# Patient Record
Sex: FEMALE | Race: WHITE | Employment: FULL TIME | URBAN - METROPOLITAN AREA
[De-identification: names, ages, dates, MRNs, and addresses within clinical notes are randomized per-mention and may not be internally consistent; named-entity substitution may affect disease eponyms.]

---

## 2019-03-15 LAB — TSH SERPL-ACNC: <0 UIU/ML (ref 0.3–5)

## 2019-05-10 ENCOUNTER — ANCILLARY PROCEDURE (OUTPATIENT)
Dept: GENERAL RADIOLOGY | Facility: CLINIC | Age: 39
End: 2019-05-10
Attending: PHYSICIAN ASSISTANT
Payer: COMMERCIAL

## 2019-05-10 ENCOUNTER — OFFICE VISIT (OUTPATIENT)
Dept: URGENT CARE | Facility: URGENT CARE | Age: 39
End: 2019-05-10
Payer: COMMERCIAL

## 2019-05-10 VITALS
WEIGHT: 150 LBS | BODY MASS INDEX: 24.11 KG/M2 | HEIGHT: 66 IN | DIASTOLIC BLOOD PRESSURE: 68 MMHG | HEART RATE: 80 BPM | TEMPERATURE: 98.2 F | RESPIRATION RATE: 12 BRPM | SYSTOLIC BLOOD PRESSURE: 110 MMHG

## 2019-05-10 DIAGNOSIS — M25.572 ACUTE LEFT ANKLE PAIN: ICD-10-CM

## 2019-05-10 DIAGNOSIS — S93.402A SPRAIN OF LEFT ANKLE, UNSPECIFIED LIGAMENT, INITIAL ENCOUNTER: Primary | ICD-10-CM

## 2019-05-10 PROCEDURE — 73630 X-RAY EXAM OF FOOT: CPT | Mod: LT | Performed by: FAMILY MEDICINE

## 2019-05-10 PROCEDURE — 73610 X-RAY EXAM OF ANKLE: CPT | Mod: LT | Performed by: FAMILY MEDICINE

## 2019-05-10 PROCEDURE — 99203 OFFICE O/P NEW LOW 30 MIN: CPT | Performed by: PHYSICIAN ASSISTANT

## 2019-05-10 RX ORDER — METHIMAZOLE 10 MG/1
15 TABLET ORAL DAILY
COMMUNITY

## 2019-05-10 ASSESSMENT — MIFFLIN-ST. JEOR: SCORE: 1382.15

## 2019-05-11 NOTE — PROGRESS NOTES
"X-SUBJECTIVE:   Denise Rosado is a 38 year old female presenting with a chief complaint of   Chief Complaint   Patient presents with     Urgent Care     Musculoskeletal Problem     rolled left ankle this afternoon on sidewalk.        She is a new patient of [facility].    MS Injury/Pain    Onset of symptoms was 7 hour(s) ago.  Location: left ankle  Context:       The injury happened while while walking      Mechanism: tripped over sidewalk, inversion injury and fall      Patient experienced immediate pain, delayed pain, was able to bear weight directly after injury but cannot any longer, no deformity was noted by the patient  Course of symptoms is worsening.    Severity moderate  Current and Associated symptoms: Pain, Swelling, Tenderness and Decreased range of motion  Denies  Bruising, Warmth and Redness  Aggravating Factors: walking, weight-bearing and movement  Therapies to improve symptoms include: ice and elevation  This is the first time this type of problem has occurred for this patient.     Review of Systems   ROS: 10 point ROS neg other than the symptoms noted above in the HPI.    No past medical history on file.  No family history on file.  Current Outpatient Medications   Medication Sig Dispense Refill     methIMAzole (TAPAZOLE PO)        order for DME Equipment being ordered: ankle boot, high  Crutches 1 Device 0     PROPRANOLOL HCL PO        sertraline (ZOLOFT) 50 MG tablet Take 50 mg by mouth daily.       Social History     Tobacco Use     Smoking status: Never Smoker     Smokeless tobacco: Never Used   Substance Use Topics     Alcohol use: Not on file       OBJECTIVE  /68   Pulse 80   Ht 1.676 m (5' 6\")   Wt 68 kg (150 lb)   LMP 05/10/2019   Breastfeeding? No   BMI 24.21 kg/m      Physical Exam   Constitutional: She is oriented to person, place, and time. She appears well-developed and well-nourished.   HENT:   Head: Normocephalic and atraumatic.   Nose: Nose normal.   Eyes: " Conjunctivae and EOM are normal.   Neck: Normal range of motion.   Pulmonary/Chest: Effort normal.   Musculoskeletal: She exhibits edema and tenderness. She exhibits no deformity.        Left foot: There is decreased range of motion, tenderness, bony tenderness and swelling. There is normal capillary refill and no deformity.   There is bony tenderness over the fifth metatarsal proximally and lateral ankle mortise along the ATFL. There is significant swelling. Sensation intact. Distal pulses intact.    Neurological: She is alert and oriented to person, place, and time. No sensory deficit.   Skin: Skin is warm and dry.   Psychiatric: She has a normal mood and affect. Her behavior is normal.   Nursing note and vitals reviewed.      Labs:  No results found for this or any previous visit (from the past 24 hour(s)).    X-Ray was done, my findings are: Negative for fracture or disruption of ankle mortise. No fracture of the fifth metatarsal.     ASSESSMENT:      ICD-10-CM    1. Sprain of left ankle, unspecified ligament, initial encounter S93.402A order for DME   2. Acute left ankle pain M25.572 XR Ankle Left G/E 3 Views     XR Foot Left G/E 3 Views        Medical Decision Making:    Differential Diagnosis:  MS Injury Pain: sprain, fracture, muscle strain, contusion and dislocation. Imaging negative for fracture. Patient most likely with sprain of ATFL of left ankle.     Serious Comorbid Conditions:  Adult:  None    PLAN:    MS Injury/Pain  ice, elevate, rest, high ankle boot with crutches, Tylenol and Ibuprofen. Referral to orthopedic  for recheck next week.     Followup:    Follow up with the orthopedic  for recheck of ankle next week. If worsening in the interim, return to clinic or present in the ED.     Geoffrey Rueda PA-C

## 2019-06-21 LAB — TSH SERPL-ACNC: <0 UIU/ML (ref 0.3–5)

## 2019-08-09 LAB — TSH SERPL-ACNC: <0 UIU/ML (ref 0.3–5)

## 2020-01-24 LAB — TSH SERPL-ACNC: <0 UIU/ML (ref 0.3–5)

## 2020-03-20 LAB — TSH SERPL-ACNC: 0.01 UIU/ML (ref 0.3–5)

## 2020-04-22 LAB — TSH SERPL-ACNC: 3.04 UIU/ML (ref 0.3–5)

## 2020-07-01 LAB — TSH SERPL-ACNC: 1.84 UIU/ML (ref 0.3–5)

## 2020-09-04 ENCOUNTER — TRANSFERRED RECORDS (OUTPATIENT)
Dept: HEALTH INFORMATION MANAGEMENT | Facility: CLINIC | Age: 40
End: 2020-09-04

## 2020-09-04 LAB — TSH SERPL-ACNC: <0 UIU/ML (ref 0.3–5)

## 2020-09-10 ENCOUNTER — TRANSFERRED RECORDS (OUTPATIENT)
Dept: HEALTH INFORMATION MANAGEMENT | Facility: CLINIC | Age: 40
End: 2020-09-10

## 2020-09-16 ENCOUNTER — TELEPHONE (OUTPATIENT)
Dept: SURGERY | Facility: CLINIC | Age: 40
End: 2020-09-16

## 2020-09-16 NOTE — TELEPHONE ENCOUNTER
Referral from [facility] for thyroid/[facility] for consult w/[facility]  Left Message for call back

## 2020-09-24 ENCOUNTER — OFFICE VISIT (OUTPATIENT)
Dept: SURGERY | Facility: CLINIC | Age: 40
End: 2020-09-24
Payer: COMMERCIAL

## 2020-09-24 VITALS
DIASTOLIC BLOOD PRESSURE: 72 MMHG | BODY MASS INDEX: 24.91 KG/M2 | HEIGHT: 66 IN | WEIGHT: 155 LBS | SYSTOLIC BLOOD PRESSURE: 126 MMHG | HEART RATE: 103 BPM

## 2020-09-24 DIAGNOSIS — E04.9 THYROID GOITER: ICD-10-CM

## 2020-09-24 DIAGNOSIS — E05.90 HYPERTHYROIDISM: ICD-10-CM

## 2020-09-24 PROCEDURE — 99205 OFFICE O/P NEW HI 60 MIN: CPT | Performed by: SURGERY

## 2020-09-24 RX ORDER — LORATADINE 10 MG/1
10 TABLET ORAL DAILY PRN
COMMUNITY

## 2020-09-24 ASSESSMENT — MIFFLIN-ST. JEOR: SCORE: 1394.83

## 2020-09-30 PROBLEM — E05.90 HYPERTHYROIDISM: Status: ACTIVE | Noted: 2020-09-30

## 2020-09-30 NOTE — PROGRESS NOTES
Surgery Consultation, [facility], BRYAN Dia MD, MD    Denise Rosado MRN# [id number]   YOB: 1980 Age: 40 year old     PCP:  Y[facility] La [phone]    Chief Complaint: Hyperthyroidism and thyroid goiter    Pt was seen in consultation from [facility].    History of Present Illness:  Denise Rosado is a 40 year old female who presented with a history of hyperthyroidism and increasing thyroid size.  Patient is a young healthy woman who has been followed for an enlarged thyroid for some time.  Is also had a diagnosis of hyperthyroidism since her last pregnancy.  She has had an interval where she was euthyroid and did not require thyroid suppression.  Does have anxiety and occasional palpitations.  The symptoms have been worsening over the last several months.  She had been on a suppression dose of 10 mg of methimazole daily with good results but recently had worsening symptoms and her dose has been increased to 15 mg.  Is also felt increasing fullness when swallowing and occasional choking.  No voice changes or difficulty breathing.  Has a family history of hypothyroidism but no history of thyroid cancer.  She is otherwise quite healthy except for anxiety.  She is here to discuss her new diagnosis and discuss possible surgical intervention.    PMH:  Denise Rosado  has no past medical history on file.  PSH:  Denise Rosado  has no past surgical history on file.    Home medications and allergies reviewed.    Social History:  Denise Rosado  reports that she has never smoked. She has never used smokeless tobacco. She reports current alcohol use. She reports that she does not use drugs.  Family History:  Denise Rosado family history includes Diabetes in her maternal grandmother; Hyperlipidemia in her paternal grandmother; Thyroid Disease in her mother.    ROS:  The 10 point Review of Systems is negative other than noted in the HPI.  No  "fevers or chills.  No recent weight loss or weight gain.  Some swallowing difficulties as mentioned.    Physical Exam:  Blood pressure 126/72, pulse 103, height 1.676 m (5' 6\"), weight 70.3 kg (155 lb), not currently breastfeeding.  155 lbs 0 oz  Thin healthy young female in no distress.  Patient has a pleasant affect and communicates well.   Pupils equal round and reactive to light.   No cervical lymphadenopathy.  Long slender neck, excellent range of motion.  Very few skin creases.  Obviously enlarged thyroid, right side much larger than left.  Gland moves appropriately with swallowing.  Gland is soft and right side measures approximately 6 to 8 cm in greatest diameter.  Left side is also enlarged but less so.  No discrete nodules.  Lung fields clear, breathing comfortably.   Heart normal sinus rhythm.  No murmurs rubs or gallops.  Abdomen soft, nontender, nondistended.  Skin warm, dry.  No obvious rashes or lesions.    All new lab and imaging data was reviewed.  Includes outside clinic notes, laboratory evaluation, ultrasound images.     Assessment/plan: Pleasant healthy 40-year-old female with a significantly enlarged thyroid and hyperthyroidism.  Her hyperthyroidism has become more problematic recently and has been more challenging to control.  Is also noticed more breakthrough palpitations and anxiety.  Although she is a candidate for medical thyroid suppression or radioactive iodine, these would be unlikely to significantly change her large and bulky thyroid gland.  I have recommended total thyroidectomy.  I think this would leave her with hypothyroidism which in general is significantly easier to control.  This would also prevent further growth of her thyroid gland.  I explained the risks and benefits of this surgery which include bleeding, infection, injury to the recurrent laryngeal nerve, and hypoparathyroidism.  I explained my use of the recurrent laryngeal nerve monitor.  This surgery would involve an " overnight stay for calcium checks and she would then be placed on thyroid replacement.  Patient was interested in getting the surgery scheduled at some point in the near future.  Surgical co-morbities include anxiety, palpitations.    Blaise Dia M.D.  [facility]  [phone]    Total time spent 60 minutes, 50 minutes of which was spent discussing treatment options, diagnosis, prognosis, surgical risks.    Please route or send letter to:  Primary Care Provider (PCP) and Referring Provider

## 2020-10-05 ENCOUNTER — PREP FOR PROCEDURE (OUTPATIENT)
Dept: SURGERY | Facility: CLINIC | Age: 40
End: 2020-10-05

## 2020-10-05 DIAGNOSIS — E04.9 THYROID GOITER: Primary | ICD-10-CM

## 2020-10-05 DIAGNOSIS — E05.90 HYPERTHYROIDISM: ICD-10-CM

## 2020-10-09 ENCOUNTER — TELEPHONE (OUTPATIENT)
Dept: SURGERY | Facility: CLINIC | Age: 40
End: 2020-10-09

## 2020-10-09 ENCOUNTER — HOSPITAL ENCOUNTER (OUTPATIENT)
Facility: CLINIC | Age: 40
End: 2020-10-09
Attending: SURGERY | Admitting: SURGERY
Payer: COMMERCIAL

## 2020-10-09 NOTE — TELEPHONE ENCOUNTER
Pt. Left message on triage line requesting call to discuss questions regarding recommended thyroid surgery.    Attempted to call patient back x 2.  Left message with call back number.    Sammi Rose RN-BSN

## 2020-10-09 NOTE — TELEPHONE ENCOUNTER
Called patient back.  All questions regarding thyroid surgery and expectations discussed to patient's satisfaction.  Provided direct dial call back number should she have any other questions. Encouraged to leave detailed message.    Sammi Rose RN-BSN

## 2020-10-10 DIAGNOSIS — Z11.59 ENCOUNTER FOR SCREENING FOR OTHER VIRAL DISEASES: Primary | ICD-10-CM

## 2020-10-12 ENCOUNTER — TELEPHONE (OUTPATIENT)
Dept: SURGERY | Facility: CLINIC | Age: 40
End: 2020-10-12

## 2020-10-12 NOTE — TELEPHONE ENCOUNTER
Type of surgery: Total thyroidectomy  Location of surgery: [facility]  Date and time of surgery: 3/4/21 at 7:30am  Surgeon: Dr. Avery Dia  Pre-Op Appt Date: Patient to schedule  Post-Op Appt Date: Patient to schedule   Packet sent out: Yes  Pre-cert/Authorization completed:  Not Applicable  Date: 10/12/20

## 2020-11-09 DIAGNOSIS — Z11.59 ENCOUNTER FOR SCREENING FOR OTHER VIRAL DISEASES: ICD-10-CM

## 2020-11-09 PROCEDURE — U0003 INFECTIOUS AGENT DETECTION BY NUCLEIC ACID (DNA OR RNA); SEVERE ACUTE RESPIRATORY SYNDROME CORONAVIRUS 2 (SARS-COV-2) (CORONAVIRUS DISEASE [COVID-19]), AMPLIFIED PROBE TECHNIQUE, MAKING USE OF HIGH THROUGHPUT TECHNOLOGIES AS DESCRIBED BY CMS-2020-01-R: HCPCS | Performed by: SURGERY

## 2020-11-09 RX ORDER — CEFAZOLIN SODIUM 1 G/3ML
1 INJECTION, POWDER, FOR SOLUTION INTRAMUSCULAR; INTRAVENOUS SEE ADMIN INSTRUCTIONS
Status: CANCELLED | OUTPATIENT
Start: 2020-11-09

## 2020-11-09 RX ORDER — CEFAZOLIN SODIUM 2 G/100ML
2 INJECTION, SOLUTION INTRAVENOUS
Status: CANCELLED | OUTPATIENT
Start: 2020-11-09

## 2020-11-10 ENCOUNTER — TELEPHONE (OUTPATIENT)
Dept: SURGERY | Facility: CLINIC | Age: 40
End: 2020-11-10

## 2020-11-10 LAB
SARS-COV-2 RNA SPEC QL NAA+PROBE: NOT DETECTED
SPECIMEN SOURCE: NORMAL

## 2020-11-10 NOTE — TELEPHONE ENCOUNTER
Planned procedure:  Total thyroidectomy    Date:  11/12/2020    Surgeon: Ifeoma      Patient calling to report that she woke up this morning with a sore throat.  Temperature currently 99.7    She had her pre-surgery covid test yesterday.  Results pending.    She is not having any problems swallowing.    Also wondering if there is a chance of her surgery being cancelled d/t needing an overnight stay?    Informed her that we will wait for covid test results to come back.  Will inform Dr Dia of present sore throat.  As long as airway is not affected, should be okay to proceed unless covid test is positive.    Will call patient back after discussion with surgeon.    Sammi Rose, RN-BSN

## 2020-11-10 NOTE — TELEPHONE ENCOUNTER
Patient is scheduled for surgery Thursday w/ [facility], thyroidectomy  and has a sore throat today. Wants to speak to a nurse    Phone: [phone]    Message ok

## 2021-02-18 DIAGNOSIS — Z11.59 ENCOUNTER FOR SCREENING FOR OTHER VIRAL DISEASES: ICD-10-CM

## 2021-03-03 ENCOUNTER — ANESTHESIA EVENT (OUTPATIENT)
Dept: SURGERY | Facility: CLINIC | Age: 41
End: 2021-03-03
Payer: COMMERCIAL

## 2021-03-03 RX ORDER — EPINEPHRINE 0.3 MG/.3ML
0.3 INJECTION SUBCUTANEOUS DAILY PRN
COMMUNITY

## 2021-03-03 RX ORDER — PROPRANOLOL HYDROCHLORIDE 10 MG/1
10 TABLET ORAL 3 TIMES DAILY PRN
COMMUNITY

## 2021-03-03 NOTE — PROGRESS NOTES
PTA medications updated by Medication Scribe prior to surgery via phone call with patient        Comments:    Medication history sources: Patient, Surescripts, H&P and Patient's home med list  Medication history source reliability: Good  Adherence assessment: N/A Not Observed    Significant changes made to the medication list:  None      Additional medication history information:   None        Prior to Admission medications    Medication Sig Last Dose Taking? 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

## 2021-03-04 ENCOUNTER — ANESTHESIA (OUTPATIENT)
Dept: SURGERY | Facility: CLINIC | Age: 41
End: 2021-03-04
Payer: COMMERCIAL

## 2021-03-04 ENCOUNTER — APPOINTMENT (OUTPATIENT)
Dept: SURGERY | Facility: PHYSICIAN GROUP | Age: 41
End: 2021-03-04
Payer: COMMERCIAL

## 2021-03-04 ENCOUNTER — HOSPITAL ENCOUNTER (OUTPATIENT)
Facility: CLINIC | Age: 41
Discharge: HOME | End: 2021-03-05
Attending: SURGERY | Admitting: SURGERY
Payer: COMMERCIAL

## 2021-03-04 ENCOUNTER — SURGERY (OUTPATIENT)
Age: 41
End: 2021-03-04
Payer: COMMERCIAL

## 2021-03-04 DIAGNOSIS — E89.0 S/P COMPLETE THYROIDECTOMY: ICD-10-CM

## 2021-03-04 DIAGNOSIS — E04.9 THYROID GOITER: Primary | ICD-10-CM

## 2021-03-04 LAB
CALCIUM SERPL-MCNC: 7.8 MG/DL (ref 8.5–10.1)
HCG UR QL: NEGATIVE

## 2021-03-04 PROCEDURE — 250N000025 HC SEVOFLURANE, PER MIN: Performed by: SURGERY

## 2021-03-04 PROCEDURE — 36415 COLL VENOUS BLD VENIPUNCTURE: CPT | Performed by: PHYSICIAN ASSISTANT

## 2021-03-04 PROCEDURE — 250N000011 HC RX IP 250 OP 636: Performed by: NURSE ANESTHETIST, CERTIFIED REGISTERED

## 2021-03-04 PROCEDURE — 250N000009 HC RX 250: Performed by: SURGERY

## 2021-03-04 PROCEDURE — 60240 REMOVAL OF THYROID: CPT | Mod: AS | Performed by: PHYSICIAN ASSISTANT

## 2021-03-04 PROCEDURE — 250N000013 HC RX MED GY IP 250 OP 250 PS 637: Performed by: SURGERY

## 2021-03-04 PROCEDURE — 250N000009 HC RX 250: Performed by: NURSE ANESTHETIST, CERTIFIED REGISTERED

## 2021-03-04 PROCEDURE — 250N000009 HC RX 250: Performed by: ANESTHESIOLOGY

## 2021-03-04 PROCEDURE — 88307 TISSUE EXAM BY PATHOLOGIST: CPT | Mod: TC | Performed by: SURGERY

## 2021-03-04 PROCEDURE — 250N000011 HC RX IP 250 OP 636: Performed by: ANESTHESIOLOGY

## 2021-03-04 PROCEDURE — 360N000076 HC SURGERY LEVEL 3, PER MIN: Performed by: SURGERY

## 2021-03-04 PROCEDURE — 370N000017 HC ANESTHESIA TECHNICAL FEE, PER MIN: Performed by: SURGERY

## 2021-03-04 PROCEDURE — 999N000141 HC STATISTIC PRE-PROCEDURE NURSING ASSESSMENT: Performed by: SURGERY

## 2021-03-04 PROCEDURE — 60240 REMOVAL OF THYROID: CPT | Performed by: SURGERY

## 2021-03-04 PROCEDURE — 250N000013 HC RX MED GY IP 250 OP 250 PS 637: Performed by: PHYSICIAN ASSISTANT

## 2021-03-04 PROCEDURE — 250N000011 HC RX IP 250 OP 636: Performed by: PHYSICIAN ASSISTANT

## 2021-03-04 PROCEDURE — 272N000001 HC OR GENERAL SUPPLY STERILE: Performed by: SURGERY

## 2021-03-04 PROCEDURE — 88307 TISSUE EXAM BY PATHOLOGIST: CPT | Mod: 26 | Performed by: PATHOLOGY

## 2021-03-04 PROCEDURE — 82310 ASSAY OF CALCIUM: CPT | Performed by: PHYSICIAN ASSISTANT

## 2021-03-04 PROCEDURE — 710N000009 HC RECOVERY PHASE 1, LEVEL 1, PER MIN: Performed by: SURGERY

## 2021-03-04 PROCEDURE — 81025 URINE PREGNANCY TEST: CPT | Performed by: ANESTHESIOLOGY

## 2021-03-04 PROCEDURE — 250N000011 HC RX IP 250 OP 636: Performed by: SURGERY

## 2021-03-04 PROCEDURE — 258N000003 HC RX IP 258 OP 636: Performed by: PHYSICIAN ASSISTANT

## 2021-03-04 PROCEDURE — 258N000003 HC RX IP 258 OP 636: Performed by: NURSE ANESTHETIST, CERTIFIED REGISTERED

## 2021-03-04 RX ORDER — FENTANYL CITRATE 50 UG/ML
INJECTION, SOLUTION INTRAMUSCULAR; INTRAVENOUS PRN
Status: DISCONTINUED | OUTPATIENT
Start: 2021-03-04 | End: 2021-03-04

## 2021-03-04 RX ORDER — FENTANYL CITRATE 50 UG/ML
25-50 INJECTION, SOLUTION INTRAMUSCULAR; INTRAVENOUS
Status: CANCELLED | OUTPATIENT
Start: 2021-03-04

## 2021-03-04 RX ORDER — NALOXONE HYDROCHLORIDE 0.4 MG/ML
0.4 INJECTION, SOLUTION INTRAMUSCULAR; INTRAVENOUS; SUBCUTANEOUS
Status: DISCONTINUED | OUTPATIENT
Start: 2021-03-04 | End: 2021-03-04

## 2021-03-04 RX ORDER — OXYCODONE HYDROCHLORIDE 5 MG/1
5-10 TABLET ORAL
Qty: 12 TABLET | Refills: 0 | Status: SHIPPED | OUTPATIENT
Start: 2021-03-04 | End: 2021-03-05

## 2021-03-04 RX ORDER — BUPIVACAINE HYDROCHLORIDE AND EPINEPHRINE 2.5; 5 MG/ML; UG/ML
INJECTION, SOLUTION INFILTRATION; PERINEURAL PRN
Status: DISCONTINUED | OUTPATIENT
Start: 2021-03-04 | End: 2021-03-04 | Stop reason: HOSPADM

## 2021-03-04 RX ORDER — PROCHLORPERAZINE MALEATE 5 MG
10 TABLET ORAL EVERY 6 HOURS PRN
Status: DISCONTINUED | OUTPATIENT
Start: 2021-03-04 | End: 2021-03-05 | Stop reason: HOSPADM

## 2021-03-04 RX ORDER — ONDANSETRON 2 MG/ML
4 INJECTION INTRAMUSCULAR; INTRAVENOUS EVERY 6 HOURS PRN
Status: DISCONTINUED | OUTPATIENT
Start: 2021-03-04 | End: 2021-03-05 | Stop reason: HOSPADM

## 2021-03-04 RX ORDER — HYDROMORPHONE HYDROCHLORIDE 1 MG/ML
.3-.5 INJECTION, SOLUTION INTRAMUSCULAR; INTRAVENOUS; SUBCUTANEOUS EVERY 10 MIN PRN
Status: DISCONTINUED | OUTPATIENT
Start: 2021-03-04 | End: 2021-03-04

## 2021-03-04 RX ORDER — HYDROCODONE BITARTRATE AND ACETAMINOPHEN 5; 325 MG/1; MG/1
1 TABLET ORAL
Status: DISCONTINUED | OUTPATIENT
Start: 2021-03-04 | End: 2021-03-05 | Stop reason: HOSPADM

## 2021-03-04 RX ORDER — ONDANSETRON 4 MG/1
4 TABLET, ORALLY DISINTEGRATING ORAL EVERY 6 HOURS PRN
Status: DISCONTINUED | OUTPATIENT
Start: 2021-03-04 | End: 2021-03-05 | Stop reason: HOSPADM

## 2021-03-04 RX ORDER — NALOXONE HYDROCHLORIDE 0.4 MG/ML
0.2 INJECTION, SOLUTION INTRAMUSCULAR; INTRAVENOUS; SUBCUTANEOUS
Status: DISCONTINUED | OUTPATIENT
Start: 2021-03-04 | End: 2021-03-05 | Stop reason: HOSPADM

## 2021-03-04 RX ORDER — NALOXONE HYDROCHLORIDE 0.4 MG/ML
0.2 INJECTION, SOLUTION INTRAMUSCULAR; INTRAVENOUS; SUBCUTANEOUS
Status: DISCONTINUED | OUTPATIENT
Start: 2021-03-04 | End: 2021-03-04

## 2021-03-04 RX ORDER — SODIUM CHLORIDE 9 MG/ML
INJECTION, SOLUTION INTRAVENOUS CONTINUOUS
Status: DISCONTINUED | OUTPATIENT
Start: 2021-03-04 | End: 2021-03-05 | Stop reason: HOSPADM

## 2021-03-04 RX ORDER — ONDANSETRON 2 MG/ML
4 INJECTION INTRAMUSCULAR; INTRAVENOUS EVERY 30 MIN PRN
Status: DISCONTINUED | OUTPATIENT
Start: 2021-03-04 | End: 2021-03-04

## 2021-03-04 RX ORDER — FENTANYL CITRATE 50 UG/ML
25-50 INJECTION, SOLUTION INTRAMUSCULAR; INTRAVENOUS
Status: DISCONTINUED | OUTPATIENT
Start: 2021-03-04 | End: 2021-03-04

## 2021-03-04 RX ORDER — SODIUM CHLORIDE, SODIUM LACTATE, POTASSIUM CHLORIDE, CALCIUM CHLORIDE 600; 310; 30; 20 MG/100ML; MG/100ML; MG/100ML; MG/100ML
INJECTION, SOLUTION INTRAVENOUS CONTINUOUS PRN
Status: DISCONTINUED | OUTPATIENT
Start: 2021-03-04 | End: 2021-03-04

## 2021-03-04 RX ORDER — ONDANSETRON 2 MG/ML
INJECTION INTRAMUSCULAR; INTRAVENOUS PRN
Status: DISCONTINUED | OUTPATIENT
Start: 2021-03-04 | End: 2021-03-04

## 2021-03-04 RX ORDER — EPINEPHRINE 0.3 MG/.3ML
0.3 INJECTION SUBCUTANEOUS DAILY PRN
Status: DISCONTINUED | OUTPATIENT
Start: 2021-03-04 | End: 2021-03-04

## 2021-03-04 RX ORDER — NALOXONE HYDROCHLORIDE 0.4 MG/ML
0.4 INJECTION, SOLUTION INTRAMUSCULAR; INTRAVENOUS; SUBCUTANEOUS
Status: DISCONTINUED | OUTPATIENT
Start: 2021-03-04 | End: 2021-03-05 | Stop reason: HOSPADM

## 2021-03-04 RX ORDER — HYDROMORPHONE HCL IN WATER/PF 6 MG/30 ML
0.2 PATIENT CONTROLLED ANALGESIA SYRINGE INTRAVENOUS
Status: DISCONTINUED | OUTPATIENT
Start: 2021-03-04 | End: 2021-03-05 | Stop reason: HOSPADM

## 2021-03-04 RX ORDER — SODIUM CHLORIDE, SODIUM LACTATE, POTASSIUM CHLORIDE, CALCIUM CHLORIDE 600; 310; 30; 20 MG/100ML; MG/100ML; MG/100ML; MG/100ML
INJECTION, SOLUTION INTRAVENOUS CONTINUOUS
Status: DISCONTINUED | OUTPATIENT
Start: 2021-03-04 | End: 2021-03-04

## 2021-03-04 RX ORDER — PROPOFOL 10 MG/ML
INJECTION, EMULSION INTRAVENOUS PRN
Status: DISCONTINUED | OUTPATIENT
Start: 2021-03-04 | End: 2021-03-04

## 2021-03-04 RX ORDER — LIDOCAINE 40 MG/G
CREAM TOPICAL
Status: DISCONTINUED | OUTPATIENT
Start: 2021-03-04 | End: 2021-03-05 | Stop reason: HOSPADM

## 2021-03-04 RX ORDER — MAGNESIUM HYDROXIDE 1200 MG/15ML
LIQUID ORAL PRN
Status: DISCONTINUED | OUTPATIENT
Start: 2021-03-04 | End: 2021-03-04 | Stop reason: HOSPADM

## 2021-03-04 RX ORDER — CEFAZOLIN SODIUM 2 G/100ML
2 INJECTION, SOLUTION INTRAVENOUS
Status: COMPLETED | OUTPATIENT
Start: 2021-03-04 | End: 2021-03-04

## 2021-03-04 RX ORDER — CEFAZOLIN SODIUM 1 G/3ML
INJECTION, POWDER, FOR SOLUTION INTRAMUSCULAR; INTRAVENOUS PRN
Status: DISCONTINUED | OUTPATIENT
Start: 2021-03-04 | End: 2021-03-04

## 2021-03-04 RX ORDER — FENTANYL CITRATE 0.05 MG/ML
50 INJECTION, SOLUTION INTRAMUSCULAR; INTRAVENOUS EVERY 5 MIN PRN
Status: DISCONTINUED | OUTPATIENT
Start: 2021-03-04 | End: 2021-03-04 | Stop reason: HOSPADM

## 2021-03-04 RX ORDER — ONDANSETRON 4 MG/1
4 TABLET, ORALLY DISINTEGRATING ORAL EVERY 30 MIN PRN
Status: DISCONTINUED | OUTPATIENT
Start: 2021-03-04 | End: 2021-03-04

## 2021-03-04 RX ORDER — CEFAZOLIN SODIUM 1 G/3ML
1 INJECTION, POWDER, FOR SOLUTION INTRAMUSCULAR; INTRAVENOUS SEE ADMIN INSTRUCTIONS
Status: DISCONTINUED | OUTPATIENT
Start: 2021-03-04 | End: 2021-03-04 | Stop reason: HOSPADM

## 2021-03-04 RX ORDER — OXYCODONE HYDROCHLORIDE 5 MG/1
5-10 TABLET ORAL
Status: DISCONTINUED | OUTPATIENT
Start: 2021-03-04 | End: 2021-03-04

## 2021-03-04 RX ORDER — LIDOCAINE HYDROCHLORIDE 20 MG/ML
INJECTION, SOLUTION INFILTRATION; PERINEURAL PRN
Status: DISCONTINUED | OUTPATIENT
Start: 2021-03-04 | End: 2021-03-04

## 2021-03-04 RX ORDER — SCOLOPAMINE TRANSDERMAL SYSTEM 1 MG/1
1 PATCH, EXTENDED RELEASE TRANSDERMAL
Status: DISCONTINUED | OUTPATIENT
Start: 2021-03-04 | End: 2021-03-04 | Stop reason: HOSPADM

## 2021-03-04 RX ORDER — DEXAMETHASONE SODIUM PHOSPHATE 4 MG/ML
INJECTION, SOLUTION INTRA-ARTICULAR; INTRALESIONAL; INTRAMUSCULAR; INTRAVENOUS; SOFT TISSUE PRN
Status: DISCONTINUED | OUTPATIENT
Start: 2021-03-04 | End: 2021-03-04

## 2021-03-04 RX ORDER — HYDROXYZINE HYDROCHLORIDE 25 MG/1
25 TABLET, FILM COATED ORAL EVERY 6 HOURS PRN
Status: DISCONTINUED | OUTPATIENT
Start: 2021-03-04 | End: 2021-03-05 | Stop reason: HOSPADM

## 2021-03-04 RX ORDER — CALCIUM CARBONATE 500(1250)
1000 TABLET ORAL 2 TIMES DAILY WITH MEALS
Status: DISCONTINUED | OUTPATIENT
Start: 2021-03-04 | End: 2021-03-05

## 2021-03-04 RX ORDER — LEVOTHYROXINE SODIUM 125 UG/1
125 TABLET ORAL
Status: DISCONTINUED | OUTPATIENT
Start: 2021-03-05 | End: 2021-03-05 | Stop reason: HOSPADM

## 2021-03-04 RX ORDER — MEPERIDINE HYDROCHLORIDE 25 MG/ML
12.5 INJECTION INTRAMUSCULAR; INTRAVENOUS; SUBCUTANEOUS
Status: DISCONTINUED | OUTPATIENT
Start: 2021-03-04 | End: 2021-03-04

## 2021-03-04 RX ORDER — EPHEDRINE SULFATE 50 MG/ML
INJECTION, SOLUTION INTRAMUSCULAR; INTRAVENOUS; SUBCUTANEOUS PRN
Status: DISCONTINUED | OUTPATIENT
Start: 2021-03-04 | End: 2021-03-04

## 2021-03-04 RX ADMIN — REMIFENTANIL HYDROCHLORIDE 0.2 MCG/KG/MIN: 1 INJECTION, POWDER, LYOPHILIZED, FOR SOLUTION INTRAVENOUS at 07:37

## 2021-03-04 RX ADMIN — Medication 5 MG: at 10:17

## 2021-03-04 RX ADMIN — Medication 5 MG: at 08:36

## 2021-03-04 RX ADMIN — SODIUM CHLORIDE, POTASSIUM CHLORIDE, SODIUM LACTATE AND CALCIUM CHLORIDE: 600; 310; 30; 20 INJECTION, SOLUTION INTRAVENOUS at 07:26

## 2021-03-04 RX ADMIN — SODIUM CHLORIDE: 9 INJECTION, SOLUTION INTRAVENOUS at 13:55

## 2021-03-04 RX ADMIN — LIDOCAINE HYDROCHLORIDE 50 MG: 20 INJECTION, SOLUTION INFILTRATION; PERINEURAL at 07:30

## 2021-03-04 RX ADMIN — CEFAZOLIN 1 G: 1 INJECTION, POWDER, FOR SOLUTION INTRAMUSCULAR; INTRAVENOUS at 09:37

## 2021-03-04 RX ADMIN — SUCCINYLCHOLINE CHLORIDE 100 MG: 20 INJECTION, SOLUTION INTRAMUSCULAR; INTRAVENOUS; PARENTERAL at 07:30

## 2021-03-04 RX ADMIN — ONDANSETRON 4 MG: 2 INJECTION INTRAMUSCULAR; INTRAVENOUS at 10:46

## 2021-03-04 RX ADMIN — Medication 5 MG: at 07:40

## 2021-03-04 RX ADMIN — DEXMEDETOMIDINE HYDROCHLORIDE 8 MCG: 100 INJECTION, SOLUTION INTRAVENOUS at 09:40

## 2021-03-04 RX ADMIN — SCOPOLAMINE 1 PATCH: 1 PATCH TRANSDERMAL at 07:11

## 2021-03-04 RX ADMIN — REMIFENTANIL HYDROCHLORIDE: 1 INJECTION, POWDER, LYOPHILIZED, FOR SOLUTION INTRAVENOUS at 09:30

## 2021-03-04 RX ADMIN — HYDROMORPHONE HYDROCHLORIDE 0.2 MG: 0.2 INJECTION, SOLUTION INTRAMUSCULAR; INTRAVENOUS; SUBCUTANEOUS at 13:54

## 2021-03-04 RX ADMIN — FENTANYL CITRATE 50 MCG: 0.05 INJECTION, SOLUTION INTRAMUSCULAR; INTRAVENOUS at 12:17

## 2021-03-04 RX ADMIN — HYDROCODONE BITARTRATE AND ACETAMINOPHEN 1 TABLET: 5; 325 TABLET ORAL at 21:07

## 2021-03-04 RX ADMIN — DEXAMETHASONE SODIUM PHOSPHATE 4 MG: 4 INJECTION, SOLUTION INTRA-ARTICULAR; INTRALESIONAL; INTRAMUSCULAR; INTRAVENOUS; SOFT TISSUE at 07:49

## 2021-03-04 RX ADMIN — SODIUM CHLORIDE 1000 ML: 900 IRRIGANT IRRIGATION at 07:30

## 2021-03-04 RX ADMIN — SODIUM CHLORIDE, POTASSIUM CHLORIDE, SODIUM LACTATE AND CALCIUM CHLORIDE: 600; 310; 30; 20 INJECTION, SOLUTION INTRAVENOUS at 10:21

## 2021-03-04 RX ADMIN — CALCIUM 1000 MG: 500 TABLET ORAL at 18:21

## 2021-03-04 RX ADMIN — BUPIVACAINE HYDROCHLORIDE AND EPINEPHRINE BITARTRATE 10 ML: 2.5; .005 INJECTION, SOLUTION EPIDURAL; INFILTRATION; INTRACAUDAL; PERINEURAL at 10:52

## 2021-03-04 RX ADMIN — Medication 10 MG: at 07:52

## 2021-03-04 RX ADMIN — PHENYLEPHRINE HYDROCHLORIDE 50 MCG: 10 INJECTION INTRAVENOUS at 10:17

## 2021-03-04 RX ADMIN — FENTANYL CITRATE 50 MCG: 50 INJECTION, SOLUTION INTRAMUSCULAR; INTRAVENOUS at 07:30

## 2021-03-04 RX ADMIN — MIDAZOLAM 2 MG: 1 INJECTION INTRAMUSCULAR; INTRAVENOUS at 07:26

## 2021-03-04 RX ADMIN — CEFAZOLIN SODIUM 2 G: 2 INJECTION, SOLUTION INTRAVENOUS at 07:37

## 2021-03-04 RX ADMIN — PROPOFOL 200 MG: 10 INJECTION, EMULSION INTRAVENOUS at 07:30

## 2021-03-04 RX ADMIN — FENTANYL CITRATE 50 MCG: 50 INJECTION, SOLUTION INTRAMUSCULAR; INTRAVENOUS at 07:26

## 2021-03-04 RX ADMIN — Medication 5 MG: at 07:48

## 2021-03-04 RX ADMIN — DEXMEDETOMIDINE HYDROCHLORIDE 12 MCG: 100 INJECTION, SOLUTION INTRAVENOUS at 08:15

## 2021-03-04 ASSESSMENT — MIFFLIN-ST. JEOR: SCORE: 1394.37

## 2021-03-04 NOTE — DISCHARGE INSTRUCTIONS
[facility]  Discharge Instructions: Post-Operative Thyroid Surgery    ACTIVITY    Take frequent, short walks and increase your activity gradually.      Avoid strenuous physical activity or heavy lifting greater than 15-20 lbs. for 1-2 weeks.  You may climb stairs.    You may drive without restrictions when you are not using any prescription pain medication and feel comfortable in a car.    You may return to work/school when you are comfortable without any prescription pain medication.    WOUND CARE    You may remove your bandage and shower 48 hours after the surgery.  Pat your incision dry and leave it open to air.  Re-apply dressing (Band-Aids or gauze/tape) as needed for comfort or drainage.    You may have steri-strips (looks like white tape) on your incision.  You may peel off the steri-strips 2 weeks after your surgery if they have not peeled off on their own.     Do not soak your incision in a tub or pool for 2 weeks.     Do not apply any lotions, creams, or ointments to your incision.    A ridge under your incision is normal and will gradually resolve.    DIET    Start with liquids, then gradually resume your regular diet as tolerated.    Drink plenty of fluids to stay hydrated.    PAIN    Expect some tenderness and discomfort at the incision site(s).  Use the prescribed pain medication at your discretion.  Expect gradual resolution of your pain over several days.    You may take ibuprofen with food (unless you have been told not to) or acetaminophen/Tylenol instead of or in addition to your prescribed pain medication.  If you are taking Norco or Percocet, do not take any additional acetaminophen/Tylenol.    Do not drink alcohol or drive while you are taking pain medications.    You may apply ice to your incisions in 20 minute intervals as needed for the next 48 hours.  After that time, consider switching to heat if you prefer.     Watch for symptoms of numbness or tingling  around the mouth or in the fingers or toes.  This may be a sign of a low calcium level.  Please contact the office so we can evaluate your symptoms.  You may need to have your blood calcium level checked by doing a simple blood test.    EXPECTATIONS    Pain medications can cause constipation.  Limit use when possible.  Take over the counter stool softener/stimulant, such as Colace or Senna, 1-2 times a day with plenty of water.  You may take a mild over the counter laxative, such as Miralax or a suppository, as needed.      You may discontinue these medications once you are having regular bowel movements and/or are no longer taking your narcotic pain medication.     RETURN APPOINTMENT    Follow up with your surgeon in 2 weeks.  Please call our office at [phone] to schedule your appointment.  We are located at [address] [place] [address].    CALL OUR OFFICE [phone] IF YOU HAVE:     Chills or fever above 101 F.    Increased redness, warmth, or drainage at your incisions.    Significant bleeding.    Pain not relieved by your pain medication or rest.    Increasing pain after the first 48 hours.    Any other concerns or questions.         Revised September 2020    Information for Patients Discharging with a Transderm Scopolamine Patch       Dry mouth is a common side effect.    Drowsiness is another common side effect especially when combined with pain medication.  Please avoid activities that require mental alertness such as driving a car or making important legal decisions.    Since Scopolamine can cause temporary dilation of the pupils and blurred vision if it comes in contact with the eyes; be sure to wash your hands thoroughly with soap and water immediately after handling the patch.   When you remove your patch, please stick it to a tissue or paper towel for disposal.      Remove the patch immediately and contact a physician in the unlikely event that you experience symptoms of  acute glaucoma (pain and reddening of the eyes, accompanied by dilated pupils).    Remove the patch if you develop any difficulties urinating.  If you cannot urinate after removing your patch, please notify your surgeon.    Remove the patch 24 hours after surgery.

## 2021-03-04 NOTE — PLAN OF CARE
Patient arrived from PACU at 1315. A/Ox4. AVSS except jose at times. Up with SBA. Regular diet. Throat dressing CDI. MARIA FERNANDA drain to bulb suction. Output dark and red. Voice soft and hoarse. Swallowing is stable but is slightly sore. Pain managed with dilaudid CMS intact. Voiding adequately.  at bedside.

## 2021-03-04 NOTE — BRIEF OP NOTE
[facility]    Brief Operative Note    Pre-operative diagnosis: Thyroid goiter [E04.9]  Hyperthyroidism [E05.90]  Post-operative diagnosis Same as pre-operative diagnosis    Procedure: Procedure(s):  TOTAL THYROIDECTOMY WITH AUTO TRANSPLANT OF PARATHYROID  Surgeon: Surgeon(s) and Role:     * Avery Dia MD - Primary     * Isai Mcallister PA-C - Assisting  Anesthesia: General   Estimated blood loss: 300cc  Drains:  MARIA FERNANDA at neck incision  Specimens:   ID Type Source Tests Collected by Time Destination   A : TOTAL THYROIDECTOMY WITH SUTURE AT RIGHT SUPERIOR POLE Tissue Thyroid SURGICAL PATHOLOGY EXAM Avery Dia MD 3/4/2021 10:15 AM      Findings:   Large vascular thyroid. Suspected parathyroid reimplanted at the left SCM.  Complications: None.  Implants: * No implants in log *    Isai Mcallister PA-C

## 2021-03-04 NOTE — ANESTHESIA POSTPROCEDURE EVALUATION
Patient: Denise Rosado    Procedure(s):  TOTAL THYROIDECTOMY WITH AUTO TRANSPLANT OF PARATHYROID    Diagnosis:Thyroid goiter [E04.9]  Hyperthyroidism [E05.90]  Diagnosis Additional Information: No value filed.    Anesthesia Type:  General    Note:     Postop Pain Control: Uneventful            Sign Out: Well controlled pain   PONV: No   Neuro/Psych: Uneventful            Sign Out: Acceptable/Baseline neuro status   Airway/Respiratory: Uneventful            Sign Out: Acceptable/Baseline resp. status   CV/Hemodynamics: Uneventful            Sign Out: Acceptable CV status   Other NRE: NONE   DID A NON-ROUTINE EVENT OCCUR? No         Last vitals:  Vitals:    03/04/21 1230 03/04/21 1324 03/04/21 1350   BP: 115/72 111/66 118/72   Pulse: 60 59 58   Resp: 15 16 16   Temp: 36.4  C (97.5  F) 36.7  C (98  F)    SpO2: 97% 97% 99%       Last vitals prior to Anesthesia Care Transfer:  CRNA VITALS  3/4/2021 1038 - 3/4/2021 1138      3/4/2021             Pulse:  113    SpO2:  99 %    Resp Rate (set):  10          Electronically Signed By: William Armendariz MD  March 4, 2021  3:47 PM

## 2021-03-04 NOTE — ANESTHESIA PREPROCEDURE EVALUATION
Anesthesia Pre-Procedure Evaluation    Patient: Denise Rosado   MRN: [id number] : 1980        Preoperative Diagnosis: Thyroid goiter [E04.9]  Hyperthyroidism [E05.90]   Procedure : Procedure(s):  TOTAL THYROIDECTOMY     Past Medical History:   Diagnosis Date     Anxiety      Goiter      Hyperthyroidism      Thyroid disease       Past Surgical History:   Procedure Laterality Date     GYN SURGERY        Allergies   Allergen Reactions     Peas Anaphylaxis      Social History     Tobacco Use     Smoking status: Former Smoker     Quit date: 2008     Years since quittin.6     Smokeless tobacco: Never Used     Tobacco comment: Was a social smoker ages [age]   Substance Use Topics     Alcohol use: Yes     Comment: 6-8 per week       Wt Readings from Last 1 Encounters:   20 70.3 kg (155 lb)        Anesthesia Evaluation            ROS/MED HX  ENT/Pulmonary:  - neg pulmonary ROS     Neurologic:  - neg neurologic ROS     Cardiovascular:  - neg cardiovascular ROS     METS/Exercise Tolerance:     Hematologic:       Musculoskeletal:       GI/Hepatic:  - neg GI/hepatic ROS     Renal/Genitourinary:  - neg Renal ROS     Endo:     (+) thyroid problem,  hyperthyroidism,     Psychiatric/Substance Use:     (+) psychiatric history anxiety and depression     Infectious Disease:  - neg infectious disease ROS     Malignancy:  - neg malignancy ROS     Other:  - neg other ROS          Physical Exam    Airway        Mallampati: II   TM distance: > 3 FB   Neck ROM: full   Mouth opening: > 3 cm    Respiratory Devices and Support         Dental  no notable dental history         Cardiovascular          Rhythm and rate: regular and normal     Pulmonary           breath sounds clear to auscultation           OUTSIDE LABS:  CBC:   Lab Results   Component Value Date    WBC 4.3 2011     BMP:   Lab Results   Component Value Date    POTASSIUM 4.4 2007    CR 0.9 2007    GLC 87 2007     COAGS: No  results found for: PTT, INR, FIBR  POC: No results found for: BGM, HCG, HCGS  HEPATIC:   Lab Results   Component Value Date    ALT 13 11/01/2007    AST 20 11/01/2007     OTHER:   Lab Results   Component Value Date    TSH <0.00 (L) 09/04/2020       Anesthesia Plan    ASA Status:  2      Anesthesia Type: General.     - Airway: ETT   Induction: Intravenous, Propofol.   Maintenance: Balanced.   Techniques and Equipment:     - Airway: Video-Laryngoscope     - Lines/Monitors: 2nd IV     Consents    Anesthesia Plan(s) and associated risks, benefits, and realistic alternatives discussed. Questions answered and patient/representative(s) expressed understanding.     - Discussed with:  Patient         Postoperative Care    Pain management: Multi-modal analgesia.   PONV prophylaxis: Ondansetron (or other 5HT-3), Dexamethasone or Solumedrol, Background Propofol Infusion, Scopolamine patch     Comments:                William Armendariz MD

## 2021-03-04 NOTE — ANESTHESIA CARE TRANSFER NOTE
Patient: Denise Rosado    Procedure(s):  TOTAL THYROIDECTOMY WITH AUTO TRANSPLANT OF PARATHYROID    Diagnosis: Thyroid goiter [E04.9]  Hyperthyroidism [E05.90]  Diagnosis Additional Information: No value filed.    Anesthesia Type:   General     Note:    Oropharynx: oropharynx clear of all foreign objects and spontaneously breathing  Level of Consciousness: awake  Oxygen Supplementation: face mask  Level of Supplemental Oxygen (L/min / FiO2): 6  Independent Airway: airway patency satisfactory and stable  Dentition: dentition unchanged  Vital Signs Stable: post-procedure vital signs reviewed and stable  Report to RN Given: handoff report given  Patient transferred to: PACU  Comments: Neuromuscular blockade not used after succinylcholine for intubation, spontaneous return of TOF 4/4 with sustained tetany, spontaneous respirations, adequate tidal volumes, followed commands to voice, oropharynx suctioned with soft flexible catheter, extubated atraumatically, extubated with suction, airway patent after extubation.  Oxygen via facemask at 6 liters per minute to PACU. Oxygen tubing connected to wall O2 in PACU, SpO2, NiBP, and EKG monitors and alarms on and functioning, Anirudh Hugger warmer connected to patient gown, report on patient's clinical status given to PACU RN, RN questions answered.     Handoff Report: Identifed the Patient, Identified the Reponsible Provider, Reviewed the pertinent medical history, Discussed the surgical course, Reviewed Intra-OP anesthesia mangement and issues during anesthesia, Set expectations for post-procedure period and Allowed opportunity for questions and acknowledgement of understanding      Vitals: (Last set prior to Anesthesia Care Transfer)  CRNA VITALS  3/4/2021 1038 - 3/4/2021 1115      3/4/2021             Pulse:  113    SpO2:  99 %    Resp Rate (set):  10        Electronically Signed By: Sally Scherer  March 4, 2021  11:15 AM

## 2021-03-04 NOTE — OP NOTE
General Surgery Operative Note    PREOPERATIVE DIAGNOSIS:  Thyroid goiter [E04.9]  Hyperthyroidism [E05.90]    POSTOPERATIVE DIAGNOSIS:  Same    PROCEDURE:  Total thyroidectomy with recurrent laryngeal nerve monitor.    ANESTHESIA:  General.    PREOPERATIVE MEDICATIONS:  Ancef IV.    SURGEON:  Avery Dia MD, MD    ASSISTANT:  Isai Mcallister PA-C. First assistant was necessary due to challenging exposure and the need for improved visualization and help maintaining hemostasis.    ESTIMATED BLOOD LOSS: 350 cc's    INDICATIONS:  Denise Rosado is a 40 year old female who has had hyperthyroidism.  She has also had symptoms including swallowing difficulty.  She now presents for total thyroidectomy.    DESCRIPTION OF PROCEDURE:  The patient was placed supine, head and neck in extension and a bump between the scapulae.  Transverse cervical neck creases were marked in the preinduction area and the one most suitable was utilized for exposure.  Superior and inferior skin flaps raised.  Midline fascia opened and reflected to the right.  Patient has a diagnosis of Graves' disease, and the gland was particularly vascular.  There were many engorged veins anteriorly which were controlled with cautery and ties.  The upper pole was taken down by double ligation and division.  The superior pole in the right was very high and needed to be taken down in several stages of clamps and ties.  Middle thyroidal vein and inferior pole veins were ligated and the gland reflected medially.  The superior parathyroid and recurrent laryngeal nerve were readily seen and preserved.  Nerve conduction was confirmed with nerve monitor.  The posterior dissection was meticulously done to ligate and divide the inferior thyroidal artery and the ligament of Berry.  The inferior parathyroid was seen riding very high upon the thyroid tissue.  This appeared to have very tenuous vascularity and I elected to remove it and set it aside.  We then  proceeded with the left thyroid lobectomy.  The upper pole was taken down by double ligation and division.  Middle thyroidal vein and inferior pole veins were ligated and the gland reflected medially.  The superior parathyroid and recurrent laryngeal nerve were readily seen and preserved.  The posterior dissection was meticulously done to ligate and divide the inferior thyroidal artery and the ligament of Berry.  The inferior parathyroid was seen and preserved.  Once the remaining attachments were divided, the gland was oriented for pathology and submitted for permanent sections.  We then proceeded with autotransplantation of the right inferior parathyroid gland.  The gland itself was morcellated with a scalpel.  We opened the fascia over the left sternocleidomastoid muscle and created several pockets.  The morcellated parathyroid was placed within three separate pockets in the SCM.  Given the vascularity of the thyroid gland and greater than usual blood loss, I elected to place a small amount of fibrillar within the wound and left a 10 Dominican drain behind.  The overlying fascia was then closed with a 5-0 Prolene.The site was inspected for hemostasis, irrigated and closed using running 3-0 Vicryl for the midline fascia, interrupted for platysma and 4-0 subcuticular Monocryl for skin.  The patient transferred to recovery in good condition.    INTRAOPERATIVE FINDINGS:  1.  Large multinodular hypervascular thyroid gland  2.  Both recurrent laryngeal nerves seen and preserved.  Nerve conduction confirmed with nerve monitor.  3.  Right superior parathyroid, left superior, and left inferior parathyroid seen and preserved.    4.  Right inferior parathyroid gland morcellated and transplanted into left sternocleidomastoid muscle    Specimens:   ID Type Source Tests Collected by Time Destination   A : TOTAL THYROIDECTOMY WITH SUTURE AT RIGHT SUPERIOR POLE Tissue Thyroid SURGICAL PATHOLOGY EXAM Avery Dia MD  3/4/2021 10:15 AM        Avery Dia MD, MD

## 2021-03-04 NOTE — ANESTHESIA PROCEDURE NOTES
Airway   Date/Time: 3/4/2021 7:31 AM   Patient location during procedure: OR  Staff -   Anesthesiologist:  William Armendariz MD  CRNA: Sally Scherer  Performed By: CRNA    Indications and Patient Condition  Indications for airway management: tobi-procedural  Induction type:intravenousMask difficulty assessment: 1 - vent by mask    Final Airway Details  Final airway type: endotracheal airway  Successful airway:ETT - single, NIM and Oral  Endotracheal Airway Details   ETT size (mm): 7.0  Cuffed: yes  Successful intubation technique: video laryngoscopy  Grade View of Cords: 1  Adjucts: stylet  Measured from: gums/teeth  Secured at (cm): 22  Secured with: pink tape  Bite block used: None    Post intubation assessment   Placement verified by: capnometry and equal breath sounds   Number of attempts at approach: 1  Secured with:pink tape  Ease of procedure: easy  Dentition: Intact and Unchanged

## 2021-03-05 ENCOUNTER — NURSE TRIAGE (OUTPATIENT)
Dept: NURSING | Facility: CLINIC | Age: 41
End: 2021-03-05

## 2021-03-05 VITALS
SYSTOLIC BLOOD PRESSURE: 136 MMHG | RESPIRATION RATE: 16 BRPM | DIASTOLIC BLOOD PRESSURE: 73 MMHG | HEIGHT: 66 IN | BODY MASS INDEX: 24.89 KG/M2 | WEIGHT: 154.9 LBS | OXYGEN SATURATION: 97 % | HEART RATE: 74 BPM | TEMPERATURE: 98.7 F

## 2021-03-05 LAB
ANION GAP SERPL CALCULATED.3IONS-SCNC: 4 MMOL/L (ref 3–14)
BUN SERPL-MCNC: 10 MG/DL (ref 7–30)
CALCIUM SERPL-MCNC: 8.2 MG/DL (ref 8.5–10.1)
CHLORIDE SERPL-SCNC: 106 MMOL/L (ref 94–109)
CO2 SERPL-SCNC: 30 MMOL/L (ref 20–32)
CREAT SERPL-MCNC: 0.68 MG/DL (ref 0.52–1.04)
ERYTHROCYTE [DISTWIDTH] IN BLOOD BY AUTOMATED COUNT: 12 % (ref 10–15)
GFR SERPL CREATININE-BSD FRML MDRD: >90 ML/MIN/{1.73_M2}
GLUCOSE SERPL-MCNC: 90 MG/DL (ref 70–99)
HCT VFR BLD AUTO: 35.8 % (ref 35–47)
HGB BLD-MCNC: 11.9 G/DL (ref 11.7–15.7)
MCH RBC QN AUTO: 30.1 PG (ref 26.5–33)
MCHC RBC AUTO-ENTMCNC: 33.2 G/DL (ref 31.5–36.5)
MCV RBC AUTO: 91 FL (ref 78–100)
PLATELET # BLD AUTO: 142 10E9/L (ref 150–450)
POTASSIUM SERPL-SCNC: 3.8 MMOL/L (ref 3.4–5.3)
RBC # BLD AUTO: 3.95 10E12/L (ref 3.8–5.2)
SODIUM SERPL-SCNC: 140 MMOL/L (ref 133–144)
WBC # BLD AUTO: 5.8 10E9/L (ref 4–11)

## 2021-03-05 PROCEDURE — 250N000013 HC RX MED GY IP 250 OP 250 PS 637: Performed by: SURGERY

## 2021-03-05 PROCEDURE — 80048 BASIC METABOLIC PNL TOTAL CA: CPT | Performed by: PHYSICIAN ASSISTANT

## 2021-03-05 PROCEDURE — 250N000013 HC RX MED GY IP 250 OP 250 PS 637: Performed by: PHYSICIAN ASSISTANT

## 2021-03-05 PROCEDURE — 85027 COMPLETE CBC AUTOMATED: CPT | Performed by: PHYSICIAN ASSISTANT

## 2021-03-05 PROCEDURE — 36415 COLL VENOUS BLD VENIPUNCTURE: CPT | Performed by: PHYSICIAN ASSISTANT

## 2021-03-05 RX ORDER — TRAMADOL HYDROCHLORIDE 50 MG/1
50 TABLET ORAL EVERY 4 HOURS PRN
Qty: 15 TABLET | Refills: 0 | Status: SHIPPED | OUTPATIENT
Start: 2021-03-05 | End: 2021-03-08

## 2021-03-05 RX ORDER — CALCIUM CARBONATE 500(1250)
1000 TABLET ORAL
Qty: 500 TABLET | Refills: 1 | Status: SHIPPED | OUTPATIENT
Start: 2021-03-05

## 2021-03-05 RX ORDER — CALCIUM CARBONATE 500(1250)
1000 TABLET ORAL
Status: DISCONTINUED | OUTPATIENT
Start: 2021-03-05 | End: 2021-03-05 | Stop reason: HOSPADM

## 2021-03-05 RX ORDER — LEVOTHYROXINE SODIUM 125 UG/1
125 TABLET ORAL
Qty: 90 TABLET | Refills: 3 | Status: SHIPPED | OUTPATIENT
Start: 2021-03-06

## 2021-03-05 RX ADMIN — CALCIUM 1000 MG: 500 TABLET ORAL at 08:20

## 2021-03-05 RX ADMIN — LEVOTHYROXINE SODIUM 125 MCG: 125 TABLET ORAL at 06:54

## 2021-03-05 RX ADMIN — HYDROCODONE BITARTRATE AND ACETAMINOPHEN 1 TABLET: 5; 325 TABLET ORAL at 05:05

## 2021-03-05 RX ADMIN — HYDROCODONE BITARTRATE AND ACETAMINOPHEN 1 TABLET: 5; 325 TABLET ORAL at 11:04

## 2021-03-05 RX ADMIN — CALCIUM 1000 MG: 500 TABLET ORAL at 11:04

## 2021-03-05 RX ADMIN — HYDROCODONE BITARTRATE AND ACETAMINOPHEN 1 TABLET: 5; 325 TABLET ORAL at 00:48

## 2021-03-05 ASSESSMENT — ACTIVITIES OF DAILY LIVING (ADL)
WEAR_GLASSES_OR_BLIND: NO
DIFFICULTY_COMMUNICATING: NO
DOING_ERRANDS_INDEPENDENTLY_DIFFICULTY: NO
DRESSING/BATHING_DIFFICULTY: NO
FALL_HISTORY_WITHIN_LAST_SIX_MONTHS: NO
TOILETING_ISSUES: NO
WALKING_OR_CLIMBING_STAIRS_DIFFICULTY: NO
CONCENTRATING,_REMEMBERING_OR_MAKING_DECISIONS_DIFFICULTY: NO
DIFFICULTY_EATING/SWALLOWING: NO

## 2021-03-05 NOTE — DISCHARGE SUMMARY
[facility] Discharge Summary    Denise Rosado MRN# [id number]   Age: 40 year old YOB: 1980     Date of Admission:  3/4/2021  Date of Discharge:  3/5/2021  1:59 PM  Admitting Provider:  Avery Dia MD  Discharge Provider:  Isai Mcallister PA-C  Discharging Service: General Surgery     Primary Provider: Yoli La  Primary Care Physician Phone Number: None          Admission Diagnoses:   Principle Diagnosis:  Thyroid goiter [E04.9]  Hyperthyroidism [E05.90]         Discharge Diagnosis:     Thyroid goiter [E04.9]  Hyperthyroidism [E05.90]  Hypocalcemia  Hypothyroidism          Procedures:     Procedure(s): Total thyroidectomy with recurrent laryngeal nerve monitor.            Discharge Medications:     Discharge Medication List as of 3/5/2021  1:04 PM      START taking these medications    Details   calcium carbonate 500 mg, elemental, (OSCAL) 500 MG tablet Take 2 tablets (1,000 mg) by mouth 3 times daily (with meals), Disp-500 tablet, R-1, E-Prescribe      levothyroxine (SYNTHROID/LEVOTHROID) 125 MCG tablet Take 1 tablet (125 mcg) by mouth every morning (before breakfast), Disp-90 tablet, R-3, E-Prescribe      traMADol (ULTRAM) 50 MG tablet Take 1 tablet (50 mg) by mouth every 4 hours as needed for severe pain, Disp-15 tablet, R-0, E-Prescribe         CONTINUE these medications which have NOT CHANGED    Details   EPINEPHrine (ANY BX GENERIC EQUIV) 0.3 MG/0.3ML injection 2-pack Inject 0.3 mg into the muscle daily as needed for anaphylaxis (to peas) , Historical      loratadine (CLARITIN) 10 MG tablet Take 10 mg by mouth daily as needed for allergies (seasonal allergies) , Historical      methimazole (TAPAZOLE) 10 MG tablet Take 15 mg by mouth daily (1.5 X 10 mg), Historical      propranolol (INDERAL) 10 MG tablet Take 10 mg by mouth 3 times daily as needed , Historical                 Allergies:         Allergies   Allergen Reactions     Peas Anaphylaxis             Brief History  "of Illness:   Denise Rosado is a 40 year old female who has had hyperthyroidism.  She has also had symptoms including swallowing difficulty.  She now presents for total thyroidectomy.           Hospital Course:   Denise Rosado's hospital course was unremarkable.  She recovered as anticipated and experienced no post-operative complications. There was minimal serous output from the drain and it was removed the morning after surgery. Calcium was low at 7.8 the evening of surgery but recovered to 8.2 the following morning with supplemental Calcium 500mg BID. This was increased to TID at discharge.  Thyroid hormone replacement was initiated postoperatively as well.     On the date of discharge, the patient was discharged to home in stable condition and afebrile.  She verbalized understanding of all discharge instructions and felt comfortable with the discharge plan.  She was asked to call with any further questions or concerns.         Condition on Discharge:        Discharge condition: Stable   Discharge vitals: Blood pressure 136/73, pulse 74, temperature 98.7  F (37.1  C), temperature source Oral, resp. rate 16, height 1.676 m (5' 6\"), weight 70.3 kg (154 lb 14.4 oz), last menstrual period 03/02/2021, SpO2 97 %, not currently breastfeeding.           Discharge Disposition:     Discharged to home          Discharge Instructions and Follow-Up:      Denise Rosado was asked to follow up with surgical team in 1-2 weeks.         Isai Mcallister PA-C   Office: [phone]            "

## 2021-03-05 NOTE — PROGRESS NOTES
Surgery  Pt did well overnight.  Pain mild.  Minimal drain output, serous; drain removed  Incision looks good  Voice weak, swallowing uncomfortable  Calcium was low last night, waiting on recheck  Will increase supplemental calcium, consider IV calcium if necessary based on level this morning.  May need recheck later this morning before discharge.  Discharge likely early afternoon  Blaise Dia MD  General Surgery, Office [phone]    Addendum:  Calcium improved.  Discharge planning for later this morning/early afternoon.

## 2021-03-05 NOTE — PROGRESS NOTES
Patients pain very mild not wanting to take oxy or dilaudid. Wanting Tylenol insetad but no order in place. On call paged

## 2021-03-05 NOTE — PROGRESS NOTES
Surgery    Patient seen and examined.   Good progress.  Voice already stronger.  No concerns.  Home today.  Plan and rx discussed.  Agree with student note below.    Isai Mcallister PA-C          General Surgery Progress Note  [facility]  Patient MRN: [id number]  03/05/2021    HPI:  Carolann is a 40 year old female POD1 s/p total thyroidectomy. The procedure was completed by Dr. Dia yesterday morning (3/4/2021). Patient states she is doing well overall since the operation. She feels her pain is controlled with Norco, most recent dose was 1 tablet this mornig at 0505. She is speaking very soft during our conversation and admits to difficulty swallowing. Since the procedure, she has been having water, ice chips, and occasional juice. She has an ice pack applied to her neck which she says is helpful for the discomfort.     Review of Systems:  Admits to slight nausea when walking to and from the bathroom. Fatigue.   Denies fever, chills, chest pain, shortness of breath, vomiting, calf pain/tenderness  Denies symptoms of hypothyroidism including constipation and cold intolerance.  Denies symptoms of hypocalcemia including muscle spasms    Physical Exam:  Vitals: T 98.7F, HR 74, /74, RR 16, 02 Sat 97% on RA.  General: Appears slightly uncomfortable but in NAD.  Head: NCAT  Eyes: Pupils round  Neck: No erythema or swelling noted around bandage site. Bandage clean and intact. No LAD. No goiter.   Chest: S1 and S2 appreciated. No murmurs, rubs, or gallops.   Lungs: CTAB. No wheezes or rhonchi   Extremities: Peripheral pulses intact. No calf tenderness.     Lab Values:  3/4/2021 Calcium = 7.8  3/5/2021 Calcium = 8.2  3/5/2021 Hemoglobin = 11.9    Assessment/Plan:  Per Dr. Dia's note, patient will likely be able to discharge this afternoon. She will be discharged on levothyroxine 125 mcg daily for thyroid hormone replacement. She will continue her calcium carbonate supplement of 1000 mg TID with meals.  She will be given Oxycodone 5mg tablets as needed for pain at discharge.     YANE Garza

## 2021-03-05 NOTE — PLAN OF CARE
Alert and oriented x4. Vital signs stable on RA. Up with SBA. Tolerating regular diet. Lungs sounds clear. BS+, No BM this shift. Neck incision dressing, CDI. MARIA FERNANDA drain in place with serosanguineous output, Stripped Q4H. Pain managed with [place] PO. Denies nausea. CMS intact.

## 2021-03-05 NOTE — PLAN OF CARE
Pt. A & O x 4.  VSS on RA.  Regular diet.  Throat sore.  MARIA FERNANDA removed by surgeon this a.m. Pain managed w/ norco.  SBA.  Calcium levels stable.  Pt. Given discharge orders.  Pt. Given filled prescriptions to include oxy.  Pt. Discharged to home w/

## 2021-03-06 NOTE — TELEPHONE ENCOUNTER
Patient calling - says she was discharged from hospital today.  Had thyroid surgery yesterday.  Is asking if she can take tylenol with other medications she is taking.  Advised to call pharmacist to discuss medication compatibilitly.    Lupe Castillo RN  Triage Nurse Advisor      Reason for Disposition    Caller has medication question about med not prescribed by PCP and triager unable to answer question (e.g., compatibility with other med, storage)    Protocols used: MEDICATION QUESTION CALL-A-

## 2021-03-08 ENCOUNTER — TELEPHONE (OUTPATIENT)
Dept: SURGERY | Facility: CLINIC | Age: 41
End: 2021-03-08

## 2021-03-08 LAB — COPATH REPORT: NORMAL

## 2021-03-08 NOTE — TELEPHONE ENCOUNTER
Procedure:  Total thyroidectomy with recurrent laryngeal nerve monitor    Date:  03/04/2021    Surgeon:  Ifeoma    Patient calling for surgical pathology results.  Still pending.    She also reports feeling an occasional heart palpitation.  Informed her this is likely d/t synthroid as she has never been on it before. But, if this persists or becomes more frequent, she should call back.    Wondering if calcium labs should be drawn again prior to her post operative visit in two weeks. Also, wondering if she should continue same regimen of calcium supplementation?    Advised her to stay on present dosing of calcium until her post operative visit. Continue to monitor her symptoms and if she starts experiencing tingling in mouth, fingers, toes, etc, she should call and will have calcium levels drawn.    She is in agreement to this plan and she will call PRN.    Will contact patient once pathology is back.    Sammi Rose RN-BSN

## 2021-03-09 NOTE — RESULT ENCOUNTER NOTE
I have already discussed the results with the patient.  I have decreased her calcium supplementation.

## 2021-03-10 ENCOUNTER — TELEPHONE (OUTPATIENT)
Dept: SURGERY | Facility: CLINIC | Age: 41
End: 2021-03-10

## 2021-03-10 DIAGNOSIS — E83.51 HYPOCALCEMIA: ICD-10-CM

## 2021-03-10 DIAGNOSIS — E89.0 S/P TOTAL THYROIDECTOMY: Primary | ICD-10-CM

## 2021-03-10 NOTE — TELEPHONE ENCOUNTER
Called Carolann to discuss her surgical pathology results. She reports Dr. Dia did call her yesterday to discuss.    She reports he does want a calcium draw sometime this week or next week.  Her calcium was decreased to four per day, starting today.    Will fax order for calcium to her [facility].    Will call her when results are received. The plan is to obtain labs sometime beginning of next week, unless she becomes symptomatic.    Sammi Rose RN-BSN\

## 2021-03-12 ENCOUNTER — TELEPHONE (OUTPATIENT)
Dept: SURGERY | Facility: CLINIC | Age: 41
End: 2021-03-12

## 2021-03-15 ENCOUNTER — TRANSFERRED RECORDS (OUTPATIENT)
Dept: HEALTH INFORMATION MANAGEMENT | Facility: CLINIC | Age: 41
End: 2021-03-15

## 2021-03-18 ENCOUNTER — TELEPHONE (OUTPATIENT)
Dept: SURGERY | Facility: CLINIC | Age: 41
End: 2021-03-18

## 2021-03-18 DIAGNOSIS — E83.51 HYPOCALCEMIA: Primary | ICD-10-CM

## 2021-03-18 NOTE — TELEPHONE ENCOUNTER
Called patient to inform her lab appt made for 0820 on the 23rd before she sees Dr. Dia.    Check in at [facility].    She is in agreement and will call PRN.    Sammi Rose RN-BSN

## 2021-03-18 NOTE — TELEPHONE ENCOUNTER
Calcium 8.9    Plan:    Decrease calcium supplement to one, twice daily    Calcium level prior to appointment on the 23rd.    Left message for patient with this info.    Will call her once lab appt is set up.    Sammi Rose RN-BSN

## 2021-03-23 ENCOUNTER — HOSPITAL ENCOUNTER (OUTPATIENT)
Dept: LAB | Facility: CLINIC | Age: 41
Discharge: HOME | End: 2021-03-23
Attending: SURGERY | Admitting: SURGERY
Payer: COMMERCIAL

## 2021-03-23 ENCOUNTER — OFFICE VISIT (OUTPATIENT)
Dept: SURGERY | Facility: CLINIC | Age: 41
End: 2021-03-23
Payer: COMMERCIAL

## 2021-03-23 DIAGNOSIS — E83.51 HYPOCALCEMIA: ICD-10-CM

## 2021-03-23 DIAGNOSIS — Z09 SURGERY FOLLOW-UP EXAMINATION: Primary | ICD-10-CM

## 2021-03-23 LAB — CALCIUM SERPL-MCNC: 8.5 MG/DL (ref 8.5–10.1)

## 2021-03-23 PROCEDURE — 99024 POSTOP FOLLOW-UP VISIT: CPT | Performed by: SURGERY

## 2021-03-23 PROCEDURE — 82310 ASSAY OF CALCIUM: CPT | Performed by: SURGERY

## 2021-03-23 PROCEDURE — 36415 COLL VENOUS BLD VENIPUNCTURE: CPT | Performed by: SURGERY

## 2021-03-23 NOTE — LETTER
"          [facility]    [address], [address]  [place], [place] [address]  Phone [phone]  Fax [phone]    [address], [address]  [facility] [place] [address]  Phone [phone]  Fax [phone]    [email or web address]     2021    Re: Chauncey Interiano  : 1980    Surgical Pathology Report      Patient Name: CHAUNCEY INTERIANO   MR#: [id number]   Specimen #: [id number]   Collected: 3/4/2021   Received: 3/4/2021   Reported: 3/8/2021 17:03   Ordering Phy(s): MARIMAR LARSEN     For improved result formatting, select 'View Enhanced Report Format' under    Linked Documents section.     SPECIMEN(S):   Thyroid, total     FINAL DIAGNOSIS:   Thyroid: Total thyroidectomy:   - Benign thyroid tissue with chronic lymphocytic thyroiditis and nodular   hyperplasia   - One benign perithyroidal lymph node     Electronically signed out by:     Rafael Epstein M.D.     CLINICAL HISTORY:   Thyroid goiter, hyperthyroidism     GROSS:   The specimen is received in formalin with the patient's name and proper   identification labeled \"total   thyroidectomy \".  The specimen consists of 73 g purple nodular total   thyroidectomy (8.1 x 7.7 x 2.4 cm).   There is a suture attached marking the right superior pole.  Upon serial   section specimen has a pink pale   nodular center throughout.  No definitive invasive lesions are identified   grossly.  Representative sections   are submitted in 10 cassettes.     Summary of Sections:   A1-A5 - representative sections of right pole from superior to inferior   A6 - isthmus   A7-A10 - left pole from inferior to superior (Dictated by: Hector Elliott   3/4/2021 02:24 PM)     MICROSCOPIC:   Sections show thyroid tissue with nodular hyperplasia and multifocal   chronic inflammation with prominent   germinal centers. The thyroid follicles are partially atrophic with   abundant Hurthle cells. Patchy nuclear "   overlapping is seen. No definitive nuclear grooves or nuclear inclusions   are identified. No papillary   architecture is seen.     This case was shared in intradepartmental consultation with agreement to   the above diagnosis.     The technical component of this testing was completed at the [facility] with the professional component performed    at the [facility]   Laboratory, [address] [place] [address]  [phone] ([phone])     CPT Codes:   A: 48184-BD7     COLLECTION SITE:   Client: [facility]   Location: [facility] (S)

## 2021-03-23 NOTE — LETTER
[facility]    [facility], [address]  [place], [place] [address]  Phone [phone]  Fax [phone]    [address], [address]  [place] [address]  Phone [phone]  Fax [phone]    [email or web address]     2021    Re: Denise Rosado  : 1980      Surgery Postop Note     Denise Rosado presents today for surgical followup.  she is doing well following total thyroidectomy.  Incisions look fine with no signs of wound infection.  Final pathology was benign nodular hyperplasia.  She has tolerated her thyroid hormone replacement well.  Initially had some mild hypocalcemia but her laboratory check today revealed calcium in the normal range.  I have decreased her calcium supplementation to 500 mg daily.  She initially had some scratchiness to her voice which is virtually resolved at this point.  I expect her to make a complete recovery.  Thank you for the opportunity to help in her care.     Blaise Dia M.D.  [facility]  [phone]

## 2021-03-23 NOTE — LETTER
[facility]    [address], [address]  [place], [place] [address]  Phone [phone]  Fax [phone]    [address], [address]  [place] [address]  Phone [phone]  Fax [phone]    [email or web address]     2021    Re:  Denise Rosado  : 1980        General Surgery Operative Note     PREOPERATIVE DIAGNOSIS:  Thyroid goiter [E04.9]  Hyperthyroidism [E05.90]     POSTOPERATIVE DIAGNOSIS:  Same     PROCEDURE:  Total thyroidectomy with recurrent laryngeal nerve monitor.     ANESTHESIA:  General.     PREOPERATIVE MEDICATIONS:  Ancef IV.     SURGEON:  Avery Dia MD, MD     ASSISTANT:  Isai Mcallister PA-C. First assistant was necessary due to challenging exposure and the need for improved visualization and help maintaining hemostasis.     ESTIMATED BLOOD LOSS: 350 cc's     INDICATIONS:  Denise Rosado is a 40 year old female who has had hyperthyroidism.  She has also had symptoms including swallowing difficulty.  She now presents for total thyroidectomy.     DESCRIPTION OF PROCEDURE:  The patient was placed supine, head and neck in extension and a bump between the scapulae.  Transverse cervical neck creases were marked in the preinduction area and the one most suitable was utilized for exposure.  Superior and inferior skin flaps raised.  Midline fascia opened and reflected to the right.  Patient has a diagnosis of Graves' disease, and the gland was particularly vascular.  There were many engorged veins anteriorly which were controlled with cautery and ties.  The upper pole was taken down by double ligation and division.  The superior pole in the right was very high and needed to be taken down in several stages of clamps and ties.  Middle thyroidal vein and inferior pole veins were ligated and the gland reflected medially.  The superior parathyroid and recurrent laryngeal nerve were readily  seen and preserved.  Nerve conduction was confirmed with nerve monitor.  The posterior dissection was meticulously done to ligate and divide the inferior thyroidal artery and the ligament of Berry.  The inferior parathyroid was seen riding very high upon the thyroid tissue.  This appeared to have very tenuous vascularity and I elected to remove it and set it aside.  We then proceeded with the left thyroid lobectomy.  The upper pole was taken down by double ligation and division.  Middle thyroidal vein and inferior pole veins were ligated and the gland reflected medially.  The superior parathyroid and recurrent laryngeal nerve were readily seen and preserved.  The posterior dissection was meticulously done to ligate and divide the inferior thyroidal artery and the ligament of Berry.  The inferior parathyroid was seen and preserved.  Once the remaining attachments were divided, the gland was oriented for pathology and submitted for permanent sections.  We then proceeded with autotransplantation of the right inferior parathyroid gland.  The gland itself was morcellated with a scalpel.  We opened the fascia over the left sternocleidomastoid muscle and created several pockets.  The morcellated parathyroid was placed within three separate pockets in the SCM.  Given the vascularity of the thyroid gland and greater than usual blood loss, I elected to place a small amount of fibrillar within the wound and left a 10 [place] drain behind.  The overlying fascia was then closed with a 5-0 Prolene.The site was inspected for hemostasis, irrigated and closed using running 3-0 Vicryl for the midline fascia, interrupted for platysma and 4-0 subcuticular Monocryl for skin.  The patient transferred to recovery in good condition.     INTRAOPERATIVE FINDINGS:  1.  Large multinodular hypervascular thyroid gland  2.  Both recurrent laryngeal nerves seen and preserved.  Nerve conduction confirmed with nerve monitor.  3.  Right superior  parathyroid, left superior, and left inferior parathyroid seen and preserved.    4.  Right inferior parathyroid gland morcellated and transplanted into left sternocleidomastoid muscle     Specimens:       ID Type Source Tests Collected by Time Destination   A : TOTAL THYROIDECTOMY WITH SUTURE AT RIGHT SUPERIOR POLE Tissue Thyroid SURGICAL PATHOLOGY EXAM Avery Dia MD 3/4/2021 10:15 AM           Avery Dia MD, MD

## 2021-03-24 NOTE — PROGRESS NOTES
Surgery Postop Note    Denise Rosado presents today for surgical followup.  she is doing well following total thyroidectomy.  Incisions look fine with no signs of wound infection.  Final pathology was benign nodular hyperplasia.  She has tolerated her thyroid hormone replacement well.  Initially had some mild hypocalcemia but her laboratory check today revealed calcium in the normal range.  I have decreased her calcium supplementation to 500 mg daily.  She initially had some scratchiness to her voice which is virtually resolved at this point.  I expect her to make a complete recovery.  Thank you for the opportunity to help in her care.    Blaise Dia M.D.  [facility]  [phone]    Please send Dr. Jonas a copy of the operative note and the pathology report.      Please route or send letter to:  Primary Care Provider (PCP) and Referring Provider

## 2022-06-04 ENCOUNTER — OFFICE VISIT (OUTPATIENT)
Dept: FAMILY MEDICINE | Facility: CLINIC | Age: 42
End: 2022-06-04
Payer: COMMERCIAL

## 2022-06-04 VITALS
BODY MASS INDEX: 25.02 KG/M2 | SYSTOLIC BLOOD PRESSURE: 135 MMHG | DIASTOLIC BLOOD PRESSURE: 84 MMHG | HEART RATE: 76 BPM | WEIGHT: 155 LBS | OXYGEN SATURATION: 99 %

## 2022-06-04 DIAGNOSIS — H57.89 IRRITATION OF LEFT EYE: Primary | ICD-10-CM

## 2022-06-04 PROCEDURE — 99203 OFFICE O/P NEW LOW 30 MIN: CPT

## 2022-06-04 ASSESSMENT — ENCOUNTER SYMPTOMS
EYE REDNESS: 1
EYE ITCHING: 0
EYE PAIN: 0
EYE DISCHARGE: 0
PHOTOPHOBIA: 0

## 2022-06-04 NOTE — PATIENT INSTRUCTIONS
Get some eye drops to help lubricate the eye and use as needed.   If you have change in your vision or eye pain, come back in   May use ibuprofen as needed for pain if you have any.  Dont use your contacts until cleared  See your eye doctor on Monday if still bothering you.

## 2022-06-04 NOTE — PROGRESS NOTES
Assessment & Plan     Irritation of left eye  Left eye was irrigated out with eyewash in clinic today. She did experience some discomfort after irrigation.  Recommend not using contacts until eye is feeling better and to use a lubricating eyedrop such as GenTeal as needed.  Follow-up with eye doctor on Monday if still having issues.      20 minutes spent on the date of the encounter doing patient visit and documentation        See Patient Instructions    Return in about 2 days (around 6/6/2022), or if symptoms worsen or fail to improve.    [facility]    Charli Vuong is a 41 year old who presents for the following health issues     HPI     Carolann recently rubbed her left eye after using a sani- wipe on her hand.  Is pretty sure that her hands were completely dry when to touch the eye.  Had her contacts in at that time. Within 10 minutes of doing this the left eye became red and started tearing.   She rinsed out the eye with water at the hotel.   Denies any pain, FB sensation, purulent drainage or photophobia.     Review of Systems   Eyes: Positive for redness. Negative for photophobia, pain, discharge, itching and visual disturbance.            Objective    /84 (BP Location: Right arm, Patient Position: Sitting, Cuff Size: Adult Regular)   Pulse 76   Wt 70.3 kg (155 lb)   SpO2 99%   BMI 25.02 kg/m    Body mass index is 25.02 kg/m .  Physical Exam   GENERAL: healthy, alert and no distress  EYES: PERRL, EOMI, eyelids- no swelling, conjunctiva/corneas- conjunctival injection left eye, some tearing  NECK: no adenopathy

## 2023-01-26 NOTE — TELEPHONE ENCOUNTER
Medication:   Requested Prescriptions      No prescriptions requested or ordered in this encounter        Last Filled:      Patient Phone Number: [phone] (home)     Last appt: 11/16/2022   Next appt: Visit date not found    Last OARRS:   RX Monitoring 11/16/2022   Attestation -   Acute Pain Prescriptions -   Periodic Controlled Substance Monitoring Possible medication side effects, risk of tolerance/dependence & alternative treatments discussed. ;No signs of potential drug abuse or diversion identified. ;Assessed functional status.    Chronic Pain > 80 MEDD - Procedure:  Total thyroidectomy with recurrent laryngeal nerve monitor    Date:  03/04/2021    Surgeon:  Ifeoma    Patient wondering how long she has to wait prior to receiving covid vaccine.    Informed her that it is fine to receive this vaccine at any time.  Recommendation is no not receive it 3 days before or 3 days after procedure.    She is also wondering if it is okay to have a glass of wine this weekend.  Informed her that as long as she is not utilizing any narcotic pain medication (she isn't) that this is fine.    She verbalized understanding. No further questions or concerns.    She will call PRN.    Sammi Rose RN-BSN

## (undated) DEVICE — SURGICEL FIBRILLAR HEMOSTAT 1"X2" 1961

## (undated) DEVICE — GLOVE PROTEXIS BLUE W/NEU-THERA 7.5  2D73EB75

## (undated) DEVICE — NIM PROBE PRASS STIMULATOR PROTECTED TIP 8225101

## (undated) DEVICE — PACK UNIVERSAL SPLIT 29131

## (undated) DEVICE — SU MONOCRYL 4-0 P-3 18" UND Y494G

## (undated) DEVICE — SU VICRYL 2-0 TIE 12X18" J905T

## (undated) DEVICE — PACK MINOR SBA15MIFSE

## (undated) DEVICE — PREP CHLORAPREP W/ORANGE TINT 10.5ML 930715

## (undated) DEVICE — LINEN TOWEL PACK X5 5464

## (undated) DEVICE — GLOVE PROTEXIS W/NEU-THERA 7.5  2D73TE75

## (undated) DEVICE — Device

## (undated) DEVICE — MANIFOLD NEPTUNE 4 PORT 700-20

## (undated) DEVICE — SU PROLENE 5-0 RB-2DA 30" 8710H

## (undated) DEVICE — SU SILK 3-0 SH 30" K832H

## (undated) DEVICE — SPONGE RAY-TEC 4X8" 7318

## (undated) DEVICE — ESU PENCIL W/SMOKE EVAC NEPTUNE STRYKER 0703-046-000

## (undated) DEVICE — SU VICRYL 4-0 TIE 12X18" DYED J103T

## (undated) DEVICE — SYR EAR BULB 3OZ 0035830

## (undated) DEVICE — SOL NACL 0.9% IRRIG 1000ML BOTTLE 2F7124

## (undated) DEVICE — ESU GROUND PAD UNIVERSAL W/O CORD

## (undated) DEVICE — SU VICRYL 3-0 SH 27" UND J416H

## (undated) DEVICE — DRAIN JACKSON PRATT 10FR ROUND SU130-1321

## (undated) DEVICE — DRAIN JACKSON PRATT RESERVOIR 100ML SU130-1305

## (undated) RX ORDER — REMIFENTANIL HYDROCHLORIDE 1 MG/ML
INJECTION, POWDER, LYOPHILIZED, FOR SOLUTION INTRAVENOUS
Status: DISPENSED
Start: 2021-03-04

## (undated) RX ORDER — FENTANYL CITRATE 50 UG/ML
INJECTION, SOLUTION INTRAMUSCULAR; INTRAVENOUS
Status: DISPENSED
Start: 2021-03-04

## (undated) RX ORDER — FENTANYL CITRATE 0.05 MG/ML
INJECTION, SOLUTION INTRAMUSCULAR; INTRAVENOUS
Status: DISPENSED
Start: 2021-03-04

## (undated) RX ORDER — CEFAZOLIN SODIUM 1 G/3ML
INJECTION, POWDER, FOR SOLUTION INTRAMUSCULAR; INTRAVENOUS
Status: DISPENSED
Start: 2021-03-04

## (undated) RX ORDER — PROPOFOL 10 MG/ML
INJECTION, EMULSION INTRAVENOUS
Status: DISPENSED
Start: 2021-03-04

## (undated) RX ORDER — BUPIVACAINE HYDROCHLORIDE AND EPINEPHRINE 2.5; 5 MG/ML; UG/ML
INJECTION, SOLUTION EPIDURAL; INFILTRATION; INTRACAUDAL; PERINEURAL
Status: DISPENSED
Start: 2021-03-04

## (undated) RX ORDER — SCOLOPAMINE TRANSDERMAL SYSTEM 1 MG/1
PATCH, EXTENDED RELEASE TRANSDERMAL
Status: DISPENSED
Start: 2021-03-04